# Patient Record
Sex: FEMALE | Race: WHITE | NOT HISPANIC OR LATINO | Employment: FULL TIME | URBAN - METROPOLITAN AREA
[De-identification: names, ages, dates, MRNs, and addresses within clinical notes are randomized per-mention and may not be internally consistent; named-entity substitution may affect disease eponyms.]

---

## 2023-04-15 PROBLEM — N60.02 BENIGN BREAST CYST IN FEMALE, LEFT: Status: ACTIVE | Noted: 2023-04-15

## 2023-04-15 PROBLEM — N63.20 BREAST MASS, LEFT: Status: ACTIVE | Noted: 2023-04-15

## 2023-04-15 NOTE — ASSESSMENT & PLAN NOTE
We discussed simple, complicated, and complex cystic changes at length.  We talked about our FCM Protocol and how cysts can change over time.  She was given our Handout that discussed the various cyst types and treatment options.    Her cyst appear benign (yet complicated) and can be followed with repeat imaging at 6 months.  She knows that these findings will not increase her future risk of developing a breast cancer.  Despite these recommendations, should she notice any change - she should report it to us immediately.

## 2023-04-15 NOTE — ASSESSMENT & PLAN NOTE
I reviewed her mammogram and agree that the area of concern is most consistent with a benign appearing asymmetry.  By US this was found to be a complicated cyst that can be followed conservatively.    She understands the importance of monthly self-breast examination and is to report changes as they occur.

## 2023-04-24 ENCOUNTER — OFFICE VISIT (OUTPATIENT)
Dept: SURGERY | Facility: CLINIC | Age: 43
End: 2023-04-24
Payer: COMMERCIAL

## 2023-04-24 VITALS — HEIGHT: 62 IN | BODY MASS INDEX: 29.81 KG/M2 | WEIGHT: 162 LBS

## 2023-04-24 DIAGNOSIS — N63.21 MASS OF UPPER OUTER QUADRANT OF LEFT BREAST: ICD-10-CM

## 2023-04-24 DIAGNOSIS — N60.02 BENIGN BREAST CYST IN FEMALE, LEFT: ICD-10-CM

## 2023-04-24 DIAGNOSIS — Z76.89 REFERRAL OF PATIENT: ICD-10-CM

## 2023-04-24 PROCEDURE — 1160F PR REVIEW ALL MEDS BY PRESCRIBER/CLIN PHARMACIST DOCUMENTED: ICD-10-PCS | Mod: CPTII,S$GLB,, | Performed by: SPECIALIST

## 2023-04-24 PROCEDURE — 1160F RVW MEDS BY RX/DR IN RCRD: CPT | Mod: CPTII,S$GLB,, | Performed by: SPECIALIST

## 2023-04-24 PROCEDURE — 1159F MED LIST DOCD IN RCRD: CPT | Mod: CPTII,S$GLB,, | Performed by: SPECIALIST

## 2023-04-24 PROCEDURE — 3008F PR BODY MASS INDEX (BMI) DOCUMENTED: ICD-10-PCS | Mod: CPTII,S$GLB,, | Performed by: SPECIALIST

## 2023-04-24 PROCEDURE — 3008F BODY MASS INDEX DOCD: CPT | Mod: CPTII,S$GLB,, | Performed by: SPECIALIST

## 2023-04-24 PROCEDURE — 1159F PR MEDICATION LIST DOCUMENTED IN MEDICAL RECORD: ICD-10-PCS | Mod: CPTII,S$GLB,, | Performed by: SPECIALIST

## 2023-04-24 PROCEDURE — 99213 OFFICE O/P EST LOW 20 MIN: CPT | Mod: S$GLB,,, | Performed by: SPECIALIST

## 2023-04-24 PROCEDURE — 99213 PR OFFICE/OUTPT VISIT, EST, LEVL III, 20-29 MIN: ICD-10-PCS | Mod: S$GLB,,, | Performed by: SPECIALIST

## 2023-11-16 ENCOUNTER — TELEPHONE (OUTPATIENT)
Dept: PRIMARY CARE CLINIC | Facility: CLINIC | Age: 43
End: 2023-11-16
Payer: COMMERCIAL

## 2023-11-16 NOTE — TELEPHONE ENCOUNTER
----- Message from Mike Ridley sent at 11/16/2023 10:33 AM CST -----  Contact: [phone]  Patient is calling in regards to seeing if she can get the international vaccine on her visit on tomorrow. Pt stated that she would be going out of the country. She is needing to know if she needs to fast for appt. Please call pt back at  [phone]. Thanks KB

## 2023-11-16 NOTE — TELEPHONE ENCOUNTER
Called pt, stated we don't do in international vaccines. We can do whatever other vaccines we offer

## 2023-11-17 ENCOUNTER — OFFICE VISIT (OUTPATIENT)
Dept: PRIMARY CARE CLINIC | Facility: CLINIC | Age: 43
End: 2023-11-17
Payer: COMMERCIAL

## 2023-11-17 VITALS
WEIGHT: 152.13 LBS | TEMPERATURE: 98 F | OXYGEN SATURATION: 99 % | SYSTOLIC BLOOD PRESSURE: 120 MMHG | HEART RATE: 81 BPM | HEIGHT: 62 IN | BODY MASS INDEX: 27.99 KG/M2 | DIASTOLIC BLOOD PRESSURE: 72 MMHG

## 2023-11-17 DIAGNOSIS — Z29.89 NEED FOR MALARIA PROPHYLAXIS: ICD-10-CM

## 2023-11-17 DIAGNOSIS — Z23 ENCOUNTER FOR IMMUNIZATION: Primary | ICD-10-CM

## 2023-11-17 DIAGNOSIS — Z28.21 HEPATITIS A VACCINATION DECLINED: ICD-10-CM

## 2023-11-17 DIAGNOSIS — Z23 NEED FOR TDAP VACCINATION: ICD-10-CM

## 2023-11-17 DIAGNOSIS — Z23 NEED FOR HEPATITIS B VACCINATION: ICD-10-CM

## 2023-11-17 DIAGNOSIS — Z23 NEED FOR VACCINATION: ICD-10-CM

## 2023-11-17 DIAGNOSIS — Z23 NEED FOR IMMUNIZATION AGAINST TYPHOID: ICD-10-CM

## 2023-11-17 DIAGNOSIS — Z23 NEED FOR HEPATITIS A IMMUNIZATION: ICD-10-CM

## 2023-11-17 PROCEDURE — 90739 HEPATITIS B (RECOMBINANT) ADJUVANTED, 2 DOSE: ICD-10-PCS | Mod: S$GLB,,, | Performed by: INTERNAL MEDICINE

## 2023-11-17 PROCEDURE — 3008F PR BODY MASS INDEX (BMI) DOCUMENTED: ICD-10-PCS | Mod: CPTII,S$GLB,, | Performed by: INTERNAL MEDICINE

## 2023-11-17 PROCEDURE — 1160F PR REVIEW ALL MEDS BY PRESCRIBER/CLIN PHARMACIST DOCUMENTED: ICD-10-PCS | Mod: CPTII,S$GLB,, | Performed by: INTERNAL MEDICINE

## 2023-11-17 PROCEDURE — 90715 TDAP VACCINE GREATER THAN OR EQUAL TO 7YO IM: ICD-10-PCS | Mod: S$GLB,,, | Performed by: INTERNAL MEDICINE

## 2023-11-17 PROCEDURE — 90686 IIV4 VACC NO PRSV 0.5 ML IM: CPT | Mod: S$GLB,,, | Performed by: INTERNAL MEDICINE

## 2023-11-17 PROCEDURE — 90715 TDAP VACCINE 7 YRS/> IM: CPT | Mod: S$GLB,,, | Performed by: INTERNAL MEDICINE

## 2023-11-17 PROCEDURE — 3074F SYST BP LT 130 MM HG: CPT | Mod: CPTII,S$GLB,, | Performed by: INTERNAL MEDICINE

## 2023-11-17 PROCEDURE — 99204 PR OFFICE/OUTPT VISIT, NEW, LEVL IV, 45-59 MIN: ICD-10-PCS | Mod: 25,S$GLB,, | Performed by: INTERNAL MEDICINE

## 2023-11-17 PROCEDURE — 1159F MED LIST DOCD IN RCRD: CPT | Mod: CPTII,S$GLB,, | Performed by: INTERNAL MEDICINE

## 2023-11-17 PROCEDURE — 3008F BODY MASS INDEX DOCD: CPT | Mod: CPTII,S$GLB,, | Performed by: INTERNAL MEDICINE

## 2023-11-17 PROCEDURE — 99999 PR PBB SHADOW E&M-EST. PATIENT-LVL IV: ICD-10-PCS | Mod: PBBFAC,,, | Performed by: INTERNAL MEDICINE

## 2023-11-17 PROCEDURE — 1159F PR MEDICATION LIST DOCUMENTED IN MEDICAL RECORD: ICD-10-PCS | Mod: CPTII,S$GLB,, | Performed by: INTERNAL MEDICINE

## 2023-11-17 PROCEDURE — 90739 HEPB VACC 2/4 DOSE ADULT IM: CPT | Mod: S$GLB,,, | Performed by: INTERNAL MEDICINE

## 2023-11-17 PROCEDURE — 3078F DIAST BP <80 MM HG: CPT | Mod: CPTII,S$GLB,, | Performed by: INTERNAL MEDICINE

## 2023-11-17 PROCEDURE — 90632 HEPATITIS A VACCINE ADULT IM: ICD-10-PCS | Mod: S$GLB,,, | Performed by: INTERNAL MEDICINE

## 2023-11-17 PROCEDURE — 3074F PR MOST RECENT SYSTOLIC BLOOD PRESSURE < 130 MM HG: ICD-10-PCS | Mod: CPTII,S$GLB,, | Performed by: INTERNAL MEDICINE

## 2023-11-17 PROCEDURE — 99204 OFFICE O/P NEW MOD 45 MIN: CPT | Mod: 25,S$GLB,, | Performed by: INTERNAL MEDICINE

## 2023-11-17 PROCEDURE — 99999 PR PBB SHADOW E&M-EST. PATIENT-LVL IV: CPT | Mod: PBBFAC,,, | Performed by: INTERNAL MEDICINE

## 2023-11-17 PROCEDURE — 90686 FLU VACCINE (QUAD) GREATER THAN OR EQUAL TO 3YO PRESERVATIVE FREE IM: ICD-10-PCS | Mod: S$GLB,,, | Performed by: INTERNAL MEDICINE

## 2023-11-17 PROCEDURE — 1160F RVW MEDS BY RX/DR IN RCRD: CPT | Mod: CPTII,S$GLB,, | Performed by: INTERNAL MEDICINE

## 2023-11-17 PROCEDURE — 3078F PR MOST RECENT DIASTOLIC BLOOD PRESSURE < 80 MM HG: ICD-10-PCS | Mod: CPTII,S$GLB,, | Performed by: INTERNAL MEDICINE

## 2023-11-17 PROCEDURE — 90471 IMMUNIZATION ADMIN: CPT | Mod: S$GLB,,, | Performed by: INTERNAL MEDICINE

## 2023-11-17 PROCEDURE — 90472 IMMUNIZATION ADMIN EACH ADD: CPT | Mod: S$GLB,,, | Performed by: INTERNAL MEDICINE

## 2023-11-17 PROCEDURE — 90471 HEPATITIS B (RECOMBINANT) ADJUVANTED, 2 DOSE: ICD-10-PCS | Mod: S$GLB,,, | Performed by: INTERNAL MEDICINE

## 2023-11-17 PROCEDURE — 90472 FLU VACCINE (QUAD) GREATER THAN OR EQUAL TO 3YO PRESERVATIVE FREE IM: ICD-10-PCS | Mod: S$GLB,,, | Performed by: INTERNAL MEDICINE

## 2023-11-17 PROCEDURE — 90632 HEPA VACCINE ADULT IM: CPT | Mod: S$GLB,,, | Performed by: INTERNAL MEDICINE

## 2023-11-17 RX ORDER — DOXYCYCLINE 100 MG/1
CAPSULE ORAL
Qty: 45 CAPSULE | Refills: 0 | Status: SHIPPED | OUTPATIENT
Start: 2023-11-17

## 2023-11-17 NOTE — PROGRESS NOTES
"Subjective     Patient ID: Bell Mccray is a 43 y.o. female.    Chief Complaint: Immunizations      HPI  Here as a new pt to establish care.  Also traveling to [place] for a week.  Leaving a in a couple of days.  We reviewed the [employer] travel guidelines together.  She is originally from [place] and didn't have much health care there.      Past Medical History:   Diagnosis Date    Benign breast cyst in female, left 4/15/2023    Breast mass, left 4/15/2023     Review of patient's allergies indicates:  No Known Allergies  History reviewed. No pertinent surgical history.  History reviewed. No pertinent family history.  Social History     Socioeconomic History    Marital status:    Tobacco Use    Smoking status: Never    Smokeless tobacco: Never   Substance and Sexual Activity    Alcohol use: Yes    Drug use: Never    Sexual activity: Yes     Partners: Male         /72   Pulse 81   Temp 97.6 °F (36.4 °C)   Ht 5' 2" (1.575 m)   Wt 69 kg (152 lb 1.9 oz)   LMP 11/01/2023   SpO2 99%   BMI 27.82 kg/m²   No current outpatient medications on file as of 11/17/2023.     No current facility-administered medications on file as of 11/17/2023.       Review of Systems   All other systems reviewed and are negative.         Objective     Physical Exam  Constitutional:       General: She is not in acute distress.     Appearance: Normal appearance. She is not ill-appearing, toxic-appearing or diaphoretic.   HENT:      Head: Normocephalic and atraumatic.   Cardiovascular:      Rate and Rhythm: Normal rate.   Pulmonary:      Effort: Pulmonary effort is normal.   Skin:     General: Skin is dry.   Neurological:      General: No focal deficit present.      Mental Status: She is alert and oriented to person, place, and time.   Psychiatric:         Mood and Affect: Mood normal.         Behavior: Behavior normal.            Assessment and Plan     1. Encounter for immunization  -     Ambulatory referral/consult to Family " Practice    2. Need for Tdap vaccination  -     (In Office Administered) Tdap Vaccine    3. Hepatitis A vaccination declined    4. Need for hepatitis A immunization  -     (In Office Administered) Hepatitis A Vaccine (Adult) (IM)    5. Need for immunization against typhoid  -     (In Office Administered) Typhoid Vaccine (ViCPs) (IM)    6. Need for hepatitis B vaccination  -     (In Office Administered) Hepatitis B (Recombinant) Adjuvanted, 2 dose    7. Need for malaria prophylaxis  -     doxycycline (VIBRAMYCIN) 100 MG Cap; Take 1 capsule day before travel & daily during travel and for 4 weeks after leaving  Dispense: 45 capsule; Refill: 0    8. Need for vaccination  -     Influenza - Quadrivalent (PF)         Follow up in about 6 months (around 5/17/2024) for annual.    Immunization History   Administered Date(s) Administered    COVID-19, MRNA, LN-S, PF (MODERNA FULL 0.5 ML DOSE) 02/26/2021, 03/26/2021, 10/30/2021    COVID-19, rS-ChAdOx1, PF ([employer]) 10/30/2021    Hepatitis A, Adult 11/17/2023    Hepatitis B (recombinant) Adjuvanted, 2 dose 11/17/2023    Influenza (FLUBLOK) - Quadrivalent - Recombinant - PF *Preferred* (egg allergy) 11/04/2022    Influenza - Quadrivalent - PF *Preferred* (6 months and older) 11/13/2019, 11/02/2021, 11/17/2023    Tdap 11/17/2023

## 2024-04-08 DIAGNOSIS — N60.02 BENIGN BREAST CYST IN FEMALE, LEFT: Primary | ICD-10-CM

## 2024-04-08 NOTE — PROGRESS NOTES
Date of Service: 4/29/2024    Chief Complaint:   Bell Mccray is a 44 y.o. female presenting today for her 6-month evaluation. She is due for a repeat mammogram and ultrasound.  She reports no interval changes.     History of Present Illness:   Mrs. Mccray first presents on April 24, 2023 to discuss her recent abnormal imaging that showed a complicated cyst in the left breast.  Short-term follow up has been recommended.  MD:::Janina Rahman MD    Past Medical History:   Diagnosis Date    Benign breast cyst in female, left 4/15/2023    Breast mass, left 4/15/2023      No past surgical history on file.     Current Outpatient Medications:     doxycycline (VIBRAMYCIN) 100 MG Cap, Take 1 capsule day before travel & daily during travel and for 4 weeks after leaving, Disp: 45 capsule, Rfl: 0   Review of patient's allergies indicates:  No Known Allergies   Social History     Tobacco Use    Smoking status: Never    Smokeless tobacco: Never   Substance Use Topics    Alcohol use: Yes      No family history on file.     Review of Systems   Integumentary:  Negative for color change, rash, mole/lesion, thickening/swelling, dimpling of skin, drainage  Breast: Negative for mass and tenderness     Physical Exam   Constitutional: She appears well-developed. She is cooperative.   HENT: Normocephalic.   Cardiovascular:  Normal rate and regular rhythm.            Pulmonary/Chest: She exhibits no tenderness and no bony tenderness.   Abdominal: Soft. Normal appearance.   Musculoskeletal: Intact with no deficits  Neurological: She is alert.   Skin: No rash noted.   Breast and Chest Wall Evaluation:   Right breast exhibits no mass, no nipple discharge, no skin change and no tenderness.     Left breast exhibits no mass, no nipple discharge, no skin change and no tenderness.     Lymphadenopathy: No supraclavicular or axillary adenopathy.    MAMMOGRAM REPORT: She has some diffuse fibronodular tissue; there are no spiculated lesions,  distortions or suspicious calcifications noted; NEM    ULTRASOUND REPORT: She has a stable complicated cyst in the left breast;  NEM    NOTE:::We viewed her films together at today's visit.  We discussed the multiple views obtained and the important findings.  Even benign changes were mentioned and her questions were answered.  She knows that she may receive a formal letter or report from the Radiologist.  She is to contact us if she has questions.      ASSESSMENT and PLAN OF CARE     1. Benign breast cyst in female, left  Assessment & Plan:  We discussed simple, complicated, and complex cystic changes at length.  We talked about our FCM Protocol and how cysts can change over time.  She was given our Handout that discussed the various cyst types and treatment options.    Her cysts appear benign (yet complicated) and can be followed with repeat imaging at 6 months.  She knows that these findings will not increase her future risk of developing a breast cancer.  Despite these recommendations, should she notice any change - she should report it to us immediately.        2. Mass of left breast, unspecified quadrant  Assessment & Plan:  We reviewed our findings today and her questions were answered.  She understands that her imaging and exams have remained stable (and show nothing concerning).  She is comfortable being followed in a conservative fashion.      She understands the importance of monthly self-breast examination and knows to report any and all changes as they occur.        Medical Decision Making: It is my impression that this patient suffers all conditions contained in this medical document.  Each of these conditions did affect our plan of care and my medical decision making today.  It is my opinion that the medical decision making concerning this patient was of minimal  difficulty based on the aforementioned conditions.  Any further recommendations will be communicated to the patient by me.  I have reviewed  and verified her allergies, list of medications, medical and surgical histories, social history, and a pertinent review of symptoms.     Follow up: 1 year and prn for PE, ALYSSA MORALES) at [facility]

## 2024-04-10 DIAGNOSIS — N63.20 MASS OF LEFT BREAST, UNSPECIFIED QUADRANT: ICD-10-CM

## 2024-04-10 DIAGNOSIS — N60.02 BENIGN BREAST CYST IN FEMALE, LEFT: Primary | ICD-10-CM

## 2024-04-10 NOTE — ASSESSMENT & PLAN NOTE
We discussed simple, complicated, and complex cystic changes at length.  We talked about our FCM Protocol and how cysts can change over time.  She was given our Handout that discussed the various cyst types and treatment options.    Her cysts appear benign (yet complicated) and can be followed with repeat imaging at 6 months.  She knows that these findings will not increase her future risk of developing a breast cancer.  Despite these recommendations, should she notice any change - she should report it to us immediately.

## 2024-04-19 ENCOUNTER — PATIENT MESSAGE (OUTPATIENT)
Dept: RESEARCH | Facility: HOSPITAL | Age: 44
End: 2024-04-19
Payer: COMMERCIAL

## 2024-04-26 ENCOUNTER — OFFICE VISIT (OUTPATIENT)
Dept: PRIMARY CARE CLINIC | Facility: CLINIC | Age: 44
End: 2024-04-26
Payer: COMMERCIAL

## 2024-04-26 VITALS
SYSTOLIC BLOOD PRESSURE: 112 MMHG | BODY MASS INDEX: 28.61 KG/M2 | WEIGHT: 155.44 LBS | OXYGEN SATURATION: 98 % | HEART RATE: 74 BPM | HEIGHT: 62 IN | TEMPERATURE: 97 F | DIASTOLIC BLOOD PRESSURE: 62 MMHG

## 2024-04-26 DIAGNOSIS — Z71.84 TRAVEL ADVICE ENCOUNTER: ICD-10-CM

## 2024-04-26 DIAGNOSIS — Z09 FOLLOW-UP EXAM: Primary | ICD-10-CM

## 2024-04-26 DIAGNOSIS — Z23 NEED FOR HEPATITIS A VACCINATION: ICD-10-CM

## 2024-04-26 DIAGNOSIS — Z23 NEED FOR HEPATITIS B VACCINATION: ICD-10-CM

## 2024-04-26 PROCEDURE — 3008F BODY MASS INDEX DOCD: CPT | Mod: CPTII,S$GLB,, | Performed by: INTERNAL MEDICINE

## 2024-04-26 PROCEDURE — 90739 HEPB VACC 2/4 DOSE ADULT IM: CPT | Mod: S$GLB,,, | Performed by: INTERNAL MEDICINE

## 2024-04-26 PROCEDURE — 90472 IMMUNIZATION ADMIN EACH ADD: CPT | Mod: S$GLB,,, | Performed by: INTERNAL MEDICINE

## 2024-04-26 PROCEDURE — 99213 OFFICE O/P EST LOW 20 MIN: CPT | Mod: 25,S$GLB,, | Performed by: INTERNAL MEDICINE

## 2024-04-26 PROCEDURE — 99999 PR PBB SHADOW E&M-EST. PATIENT-LVL IV: CPT | Mod: PBBFAC,,, | Performed by: INTERNAL MEDICINE

## 2024-04-26 PROCEDURE — 3078F DIAST BP <80 MM HG: CPT | Mod: CPTII,S$GLB,, | Performed by: INTERNAL MEDICINE

## 2024-04-26 PROCEDURE — 90632 HEPA VACCINE ADULT IM: CPT | Mod: S$GLB,,, | Performed by: INTERNAL MEDICINE

## 2024-04-26 PROCEDURE — 3074F SYST BP LT 130 MM HG: CPT | Mod: CPTII,S$GLB,, | Performed by: INTERNAL MEDICINE

## 2024-04-26 PROCEDURE — 1159F MED LIST DOCD IN RCRD: CPT | Mod: CPTII,S$GLB,, | Performed by: INTERNAL MEDICINE

## 2024-04-26 PROCEDURE — 90471 IMMUNIZATION ADMIN: CPT | Mod: S$GLB,,, | Performed by: INTERNAL MEDICINE

## 2024-04-26 NOTE — PROGRESS NOTES
"Subjective     Patient ID: Bell Mccray is a 44 y.o. female.    Chief Complaint: Follow-up      HPI  Here for updated immunizations.  Due for hep b and hep a.  She plans on extensive travel but unsure of where and when.  Offered once she has her itenerary to share and I would look up [facility] travel updates for those areas.  She has no other concerns.      Past Medical History:   Diagnosis Date    Benign breast cyst in female, left 4/15/2023    Breast mass, left 4/15/2023     Review of patient's allergies indicates:  No Known Allergies  No past surgical history on file.  No family history on file.  Social History     Socioeconomic History    Marital status:    Tobacco Use    Smoking status: Never    Smokeless tobacco: Never   Substance and Sexual Activity    Alcohol use: Yes    Drug use: Never    Sexual activity: Yes     Partners: Male         /62 (BP Location: Right arm)   Pulse 74   Temp 97.3 °F (36.3 °C) (Tympanic)   Ht 5' 2.21" (1.58 m)   Wt 70.5 kg (155 lb 6.8 oz)   LMP 04/01/2024 (Approximate)   SpO2 98%   BMI 28.24 kg/m²   No current outpatient medications on file as of 4/26/2024.     Facility-Administered Medications as of 4/26/2024   Medication Dose Route Frequency Provider Last Rate Last Admin    hepatitis B virus (PF) (VFC) vaccine injection 0.5 mL  0.5 mL Intramuscular 1 time in Clinic/HOD         [COMPLETED] VFC-hepatitis A (PF) (VAQTA) 25 unit/0.5 mL vaccine 25 Units  25 Units Intramuscular 1 time in Clinic/HOD    25 Units at 04/26/24 0733       Review of Systems   All other systems reviewed and are negative.         Objective     Physical Exam  Constitutional:       Appearance: Normal appearance.   HENT:      Head: Normocephalic and atraumatic.   Cardiovascular:      Rate and Rhythm: Normal rate.   Pulmonary:      Effort: No respiratory distress.   Musculoskeletal:      Cervical back: Neck supple.   Neurological:      Mental Status: She is alert and oriented to person, place, and time. "   Psychiatric:         Mood and Affect: Mood normal.         Behavior: Behavior normal.            Assessment and Plan     1. Follow-up exam    2. Need for hepatitis B vaccination  -     Discontinue: hepatitis B virus (PF) (VFC) vaccine injection 0.5 mL  Dispense: 0.5 mL; Refill: 0  -     Discontinue: hepatitis B virus (PF) (VFC) vaccine injection 0.5 mL  -     hepatitis B virus (PF) (VFC) vaccine injection 0.5 mL  Dispense: 0.5 mL; Refill: 0  -     (In Office Administered) Hepatitis B (Recombinant) Adjuvanted, 2 dose    3. Need for hepatitis A vaccination  -     VFC-hepatitis A (PF) (VAQTA) 25 unit/0.5 mL vaccine 25 Units  Dispense: 0.5 mL; Refill: 0    4. Travel advice encounter             Immunization History   Administered Date(s) Administered    COVID-19, MRNA, LN-S, PF (MODERNA FULL 0.5 ML DOSE) 02/26/2021, 03/26/2021, 10/30/2021    COVID-19, rS-ChAdOx1, PF ([employer]) 10/30/2021    Hepatitis A, Adult 11/17/2023    Hepatitis A, Pediatric/Adolescent, 2 Dose 04/26/2024    Hepatitis B (recombinant) Adjuvanted, 2 dose 11/17/2023, 04/26/2024    Influenza (FLUBLOK) - Quadrivalent - Recombinant - PF *Preferred* (egg allergy) 11/04/2022    Influenza - Quadrivalent - PF *Preferred* (6 months and older) 11/13/2019, 11/02/2021, 11/17/2023    Tdap 11/17/2023       I spent a total of 20 minutes on the day of the visit.This includes face to face time and non-face to face time preparing to see the patient (eg, review of tests), obtaining and/or reviewing separately obtained history, documenting clinical information in the electronic or other health record, independently interpreting results and communicating results to the patient/family/caregiver, or care coordinator.

## 2024-04-29 ENCOUNTER — OFFICE VISIT (OUTPATIENT)
Dept: SURGERY | Facility: CLINIC | Age: 44
End: 2024-04-29
Payer: COMMERCIAL

## 2024-04-29 DIAGNOSIS — N60.02 BENIGN BREAST CYST IN FEMALE, LEFT: Primary | ICD-10-CM

## 2024-04-29 DIAGNOSIS — N63.20 MASS OF LEFT BREAST, UNSPECIFIED QUADRANT: ICD-10-CM

## 2024-04-29 PROCEDURE — 1159F MED LIST DOCD IN RCRD: CPT | Mod: CPTII,S$GLB,, | Performed by: SPECIALIST

## 2024-04-29 PROCEDURE — 1160F RVW MEDS BY RX/DR IN RCRD: CPT | Mod: CPTII,S$GLB,, | Performed by: SPECIALIST

## 2024-04-29 PROCEDURE — 99213 OFFICE O/P EST LOW 20 MIN: CPT | Mod: S$GLB,,, | Performed by: SPECIALIST

## 2024-04-29 PROCEDURE — 99999 PR PBB SHADOW E&M-EST. PATIENT-LVL II: CPT | Mod: PBBFAC,,, | Performed by: SPECIALIST

## 2024-05-01 PROBLEM — Z12.11 COLON CANCER SCREENING: Status: ACTIVE | Noted: 2024-05-01

## 2024-05-03 ENCOUNTER — HOSPITAL ENCOUNTER (OUTPATIENT)
Dept: PREADMISSION TESTING | Facility: HOSPITAL | Age: 44
Discharge: HOME | End: 2024-05-03
Attending: STUDENT IN AN ORGANIZED HEALTH CARE EDUCATION/TRAINING PROGRAM
Payer: COMMERCIAL

## 2024-05-03 DIAGNOSIS — Z12.11 COLON CANCER SCREENING: ICD-10-CM

## 2024-05-09 ENCOUNTER — HOSPITAL ENCOUNTER (OUTPATIENT)
Dept: PREADMISSION TESTING | Facility: HOSPITAL | Age: 44
Discharge: HOME | End: 2024-05-09
Attending: INTERNAL MEDICINE
Payer: COMMERCIAL

## 2024-05-09 DIAGNOSIS — Z12.11 COLON CANCER SCREENING: Primary | ICD-10-CM

## 2024-05-09 RX ORDER — SODIUM, POTASSIUM,MAG SULFATES 17.5-3.13G
1 SOLUTION, RECONSTITUTED, ORAL ORAL DAILY
Qty: 1 KIT | Refills: 0 | Status: SHIPPED | OUTPATIENT
Start: 2024-05-09 | End: 2024-05-11

## 2024-05-21 ENCOUNTER — ANESTHESIA EVENT (OUTPATIENT)
Dept: ENDOSCOPY | Facility: HOSPITAL | Age: 44
End: 2024-05-21
Payer: COMMERCIAL

## 2024-05-21 NOTE — ANESTHESIA PREPROCEDURE EVALUATION
05/21/2024  Bell Mccray is a 44 y.o., female.  Past Medical History:   Diagnosis Date    Benign breast cyst in female, left 4/15/2023    Breast mass, left 4/15/2023     History reviewed. No pertinent surgical history.      Pre-op Assessment    I have reviewed the Patient Summary Reports.     I have reviewed the Nursing Notes. I have reviewed the NPO Status.   I have reviewed the Medications.     Review of Systems  Anesthesia Hx:  No problems with previous Anesthesia   History of prior surgery of interest to airway management or planning:             Social:  Non-Smoker, Social Alcohol Use       Hepatic/GI:  Bowel Prep.                    Physical Exam  General: Well nourished, Cooperative, Alert and Oriented    Airway:  Mallampati: I   Mouth Opening: Normal  Tongue: Normal  Neck ROM: Normal ROM        Anesthesia Plan  Type of Anesthesia, risks & benefits discussed:    Anesthesia Type: Gen Natural Airway  Intra-op Monitoring Plan: Standard ASA Monitors  Post Op Pain Control Plan: multimodal analgesia  Induction:  IV  Informed Consent: Informed consent signed with the Patient and all parties understand the risks and agree with anesthesia plan.  All questions answered. Patient consented to blood products? No  ASA Score: 1  Day of Surgery Review of History & Physical: H&P Update referred to the surgeon/provider.    Ready For Surgery From Anesthesia Perspective.     .

## 2024-05-23 ENCOUNTER — ANESTHESIA (OUTPATIENT)
Dept: ENDOSCOPY | Facility: HOSPITAL | Age: 44
End: 2024-05-23
Payer: COMMERCIAL

## 2024-05-23 ENCOUNTER — HOSPITAL ENCOUNTER (OUTPATIENT)
Facility: HOSPITAL | Age: 44
Discharge: HOME | End: 2024-05-23
Attending: INTERNAL MEDICINE | Admitting: INTERNAL MEDICINE
Payer: COMMERCIAL

## 2024-05-23 VITALS
HEART RATE: 66 BPM | DIASTOLIC BLOOD PRESSURE: 71 MMHG | HEIGHT: 62 IN | OXYGEN SATURATION: 100 % | WEIGHT: 149.81 LBS | TEMPERATURE: 97 F | RESPIRATION RATE: 18 BRPM | SYSTOLIC BLOOD PRESSURE: 118 MMHG | BODY MASS INDEX: 27.57 KG/M2

## 2024-05-23 DIAGNOSIS — Z12.11 COLON CANCER SCREENING: Primary | ICD-10-CM

## 2024-05-23 LAB
B-HCG UR QL: NEGATIVE
CTP QC/QA: YES

## 2024-05-23 PROCEDURE — 81025 URINE PREGNANCY TEST: CPT | Performed by: INTERNAL MEDICINE

## 2024-05-23 PROCEDURE — 37000009 HC ANESTHESIA EA ADD 15 MINS: Performed by: INTERNAL MEDICINE

## 2024-05-23 PROCEDURE — D9220A PRA ANESTHESIA: Mod: 33,,, | Performed by: NURSE ANESTHETIST, CERTIFIED REGISTERED

## 2024-05-23 PROCEDURE — 63600175 PHARM REV CODE 636 W HCPCS: Performed by: INTERNAL MEDICINE

## 2024-05-23 PROCEDURE — 63600175 PHARM REV CODE 636 W HCPCS: Performed by: NURSE ANESTHETIST, CERTIFIED REGISTERED

## 2024-05-23 PROCEDURE — 45380 COLONOSCOPY AND BIOPSY: CPT | Mod: PT | Performed by: INTERNAL MEDICINE

## 2024-05-23 PROCEDURE — 25000003 PHARM REV CODE 250: Performed by: NURSE ANESTHETIST, CERTIFIED REGISTERED

## 2024-05-23 PROCEDURE — 27201012 HC FORCEPS, HOT/COLD, DISP: Performed by: INTERNAL MEDICINE

## 2024-05-23 PROCEDURE — 88305 TISSUE EXAM BY PATHOLOGIST: CPT | Mod: 26,,, | Performed by: PATHOLOGY

## 2024-05-23 PROCEDURE — 45380 COLONOSCOPY AND BIOPSY: CPT | Mod: 33,,, | Performed by: INTERNAL MEDICINE

## 2024-05-23 PROCEDURE — 37000008 HC ANESTHESIA 1ST 15 MINUTES: Performed by: INTERNAL MEDICINE

## 2024-05-23 PROCEDURE — 88305 TISSUE EXAM BY PATHOLOGIST: CPT | Performed by: PATHOLOGY

## 2024-05-23 RX ORDER — LIDOCAINE HYDROCHLORIDE 20 MG/ML
INJECTION INTRAVENOUS
Status: DISCONTINUED | OUTPATIENT
Start: 2024-05-23 | End: 2024-05-23

## 2024-05-23 RX ORDER — SODIUM CHLORIDE, SODIUM LACTATE, POTASSIUM CHLORIDE, CALCIUM CHLORIDE 600; 310; 30; 20 MG/100ML; MG/100ML; MG/100ML; MG/100ML
INJECTION, SOLUTION INTRAVENOUS CONTINUOUS
Status: DISCONTINUED | OUTPATIENT
Start: 2024-05-23 | End: 2024-05-23 | Stop reason: HOSPADM

## 2024-05-23 RX ORDER — PROPOFOL 10 MG/ML
INJECTION, EMULSION INTRAVENOUS
Status: DISCONTINUED | OUTPATIENT
Start: 2024-05-23 | End: 2024-05-23

## 2024-05-23 RX ADMIN — SODIUM CHLORIDE, POTASSIUM CHLORIDE, SODIUM LACTATE AND CALCIUM CHLORIDE: 600; 310; 30; 20 INJECTION, SOLUTION INTRAVENOUS at 09:05

## 2024-05-23 RX ADMIN — PROPOFOL 150 MG: 10 INJECTION, EMULSION INTRAVENOUS at 10:05

## 2024-05-23 RX ADMIN — PROPOFOL 30 MG: 10 INJECTION, EMULSION INTRAVENOUS at 10:05

## 2024-05-23 RX ADMIN — PROPOFOL 20 MG: 10 INJECTION, EMULSION INTRAVENOUS at 10:05

## 2024-05-23 RX ADMIN — LIDOCAINE HYDROCHLORIDE 50 MG: 20 INJECTION INTRAVENOUS at 10:05

## 2024-05-23 NOTE — H&P
Endoscopy History and Physical    PCP - Paris Gonzalez MD  Referring Physician - Gita Andersen MD  [id number] [facility] [place] [address]      ASA - per anesthesia  Mallampati - per anesthesia  History of Anesthesia problems - no  Family history Anesthesia problems -  no   Plan of anesthesia - General    HPI  44 y.o. female    Planned Procedure: Colonoscopy  Diagnosis: screening for colon cancer  Chief Complaint: Same as above    Personnel H/o colon polyps:index  FH of colon cancer:no  Anticoagulation:no      ROS:  Constitutional: No fevers, chills, No weight loss  CV: No chest pain  Pulm: No cough, No shortness of breath  GI: see HPI    Medical History:  has a past medical history of Benign breast cyst in female, left (4/15/2023) and Breast mass, left (4/15/2023).    Surgical History:  has no past surgical history on file.    Family History: family history includes Heart disease in her father..    Social History:  reports that she has never smoked. She has never used smokeless tobacco. She reports current alcohol use. She reports that she does not use drugs.    Review of patient's allergies indicates:  No Known Allergies    Medications:   No medications prior to admission.       Physical Exam:    Vital Signs:   Vitals:    05/23/24 0937   BP: 131/75   Pulse: 64   Resp: 18   Temp: 97.6 °F (36.4 °C)       General Appearance: Well appearing in no acute distress  Abdomen: Soft, non tender, non distended with normal bowel sounds, no masses    Labs:  Lab Results   Component Value Date    TSH 0.71 11/22/2023       I have explained the risks and benefits of this endoscopic procedure to the patient including but not limited to bleeding, inflammation, infection, perforation, and death.    SEDATION PLAN: per anesthesia       History reviewed, vital signs satisfactory, cardiopulmonary status satisfactory, sedation options, risks and plans have been discussed with the patient  All their questions were  answered and the patient agrees to the sedation procedures as planned and the patient is deemed an appropriate candidate for the sedation as planned.     The risks, benefits and alternatives of the procedure were discussed with the patient in detail. This discussion was had in the presence of endoscopy staff. The risks include, risks of adverse reaction to sedation requiring the use of reversal agents, bleeding requiring blood transfusion, perforation requiring surgical intervention and technical failure. Other risks include aspiration leading to respiratory distress and respiratory failure resulting in endotracheal intubation and mechanical ventilation including death. If anesthesia is being utilized for this procedure, it is up to the anesthesiologist to determine airway safety including elective endotracheal intubation. Questions were answered, they agree to proceed. There was no language barriers.       Procedure explained to patient, informed consent obtained and placed in chart.       Denis Harvey MD

## 2024-05-23 NOTE — ANESTHESIA POSTPROCEDURE EVALUATION
Anesthesia Post Evaluation    Patient: Bell Mccray    Procedure(s) Performed: Procedure(s) (LRB):  COLONOSCOPY (N/A)    Final Anesthesia Type: general      Patient location during evaluation: PACU  Patient participation: Yes- Able to Participate  Level of consciousness: awake  Post-procedure vital signs: reviewed and stable  Pain management: adequate  Airway patency: patent    PONV status at discharge: No PONV  Anesthetic complications: no      Cardiovascular status: stable  Respiratory status: unassisted  Hydration status: euvolemic  Follow-up not needed.              Vitals Value Taken Time   BP 99/56 05/23/24 1103        Pulse 64 05/23/24 1104   Resp 17 05/23/24 1104   SpO2 100 % 05/23/24 1104   Vitals shown include unfiled device data.      No case tracking events are documented in the log.      Pain/Mayra Score: No data recorded

## 2024-05-23 NOTE — DISCHARGE SUMMARY
[facility]  Discharge Note  Short Stay    Procedure(s) (LRB):  COLONOSCOPY (N/A)      OUTCOME: Patient tolerated treatment/procedure well without complication and is now ready for discharge.    DISPOSITION: Home or Self Care    FINAL DIAGNOSIS:  <principal problem not specified>    FOLLOWUP: With primary care provider    DISCHARGE INSTRUCTIONS:  No discharge procedures on file.     TIME SPENT ON DISCHARGE: 20 minutes

## 2024-05-23 NOTE — TRANSFER OF CARE
"Anesthesia Transfer of Care Note    Patient: Bell Mccray    Procedure(s) Performed: Procedure(s) (LRB):  COLONOSCOPY (N/A)    Patient location: PACU    Anesthesia Type: general    Transport from OR: Transported from OR on room air with adequate spontaneous ventilation    Post pain: adequate analgesia    Post assessment: no apparent anesthetic complications and tolerated procedure well    Post vital signs: stable    Level of consciousness: awake    Nausea/Vomiting: no nausea/vomiting    Complications: none    Transfer of care protocol was followed    Last vitals: Visit Vitals  /75 (BP Location: Right arm, Patient Position: Sitting)   Pulse 64   Temp 36.4 °C (97.6 °F) (Temporal)   Resp 18   Ht 5' 2" (1.575 m)   Wt 67.9 kg (149 lb 12.8 oz)   LMP 04/26/2024 (Approximate)   SpO2 100%   Breastfeeding No   BMI 27.40 kg/m²     "

## 2024-05-23 NOTE — PLAN OF CARE
Discharge instructions reviewed with patient and visitor. Handouts given & verbalized understanding with no further questions at this time. Dr. Harvey spoke to pt at bedside, reviewed procedure and findings, answered questions. Made aware they are awaiting biopsy results with MD telephone number provided per AVS sheet. VSS on RA, no pain or nausea noted, tolerating po fluids, no complaints noted. Fall precautions reviewed, consents in chart, PIV removed at this time.

## 2024-05-23 NOTE — [employer] PATIENT INSTRUCTIONS
Discharge Summary/Instructions after an Endoscopic Procedure  Patient Name: Bell Mccray  Patient MRN: [id number]  Patient YOB: 1980  Thursday, May 23, 2024  Denis Harvey MD  Dear patient,  As a result of recent federal legislation (The Federal Cures Act), you may   receive lab or pathology results from your procedure in your [facility]   account before your physician is able to contact you. Your physician or   their representative will relay the results to you with their   recommendations at their soonest availability.  Thank you,  RESTRICTIONS:  During your procedure today, you received medications for sedation.  These   medications may affect your judgment, balance and coordination.  Therefore,   for 24 hours, you have the following restrictions:   - DO NOT drive a car, operate machinery, make legal/financial decisions,   sign important papers or drink alcohol.    ACTIVITY:  Today: no heavy lifting, straining or running due to procedural   sedation/anesthesia.  The following day: return to full activity including work.  DIET:  Eat and drink normally unless instructed otherwise.     TREATMENT FOR COMMON SIDE EFFECTS:  - Mild abdominal pain, nausea, belching, bloating or excessive gas:  rest,   eat lightly and use a heating pad.  - Sore Throat: treat with throat lozenges and/or gargle with warm salt   water.  - Because air was used during the procedure, expelling large amounts of air   from your rectum or belching is normal.  - If a bowel prep was taken, you may not have a bowel movement for 1-3 days.    This is normal.  SYMPTOMS TO WATCH FOR AND REPORT TO YOUR PHYSICIAN:  1. Abdominal pain or bloating, other than gas cramps.  2. Chest pain.  3. Back pain.  4. Signs of infection such as: chills or fever occurring within 24 hours   after the procedure.  5. Rectal bleeding, which would show as bright red, maroon, or black stools.   (A tablespoon of blood from the rectum is not serious, especially  if   hemorrhoids are present.)  6. Vomiting.  7. Weakness or dizziness.  GO DIRECTLY TO THE NEAREST EMERGENCY ROOM IF YOU HAVE ANY OF THE FOLLOWING:      Difficulty breathing              Chills and/or fever over 101 F   Persistent vomiting and/or vomiting blood   Severe abdominal pain   Severe chest pain   Black, tarry stools   Bleeding- more than one tablespoon   Any other symptom or condition that you feel may need urgent attention  Your doctor recommends these additional instructions:  If any biopsies were taken, your doctors clinic will contact you in 1 to 2   weeks with any results.  - Discharge patient to home.   - Resume previous diet.   - Continue present medications.   - Await pathology results.   - Repeat colonoscopy in 7-10 years for surveillance based on pathology   results.   - Return to referring physician as previously scheduled.   - Patient has a contact number available for emergencies.  The signs and   symptoms of potential delayed complications were discussed with the   patient.  Return to normal activities tomorrow.  Written discharge   instructions were provided to the patient.  For questions, problems or results please call your physician Denis Harvey MD at Work:  [phone]  If you have any questions about the above instructions, call the GI   department at [phone] or call the endoscopy unit at [phone]   from 7am until 3 pm.  [facility] [place], EMERGENCY ROOM PHONE NUMBER:   [phone]  IF A COMPLICATION OR EMERGENCY SITUATION ARISES AND YOU ARE UNABLE TO REACH   YOUR PHYSICIAN - GO DIRECTLY TO THE EMERGENCY ROOM.  I have read or have had read to me these discharge instructions for my   procedure and have received a written copy.  I understand these   instructions and will follow-up with my physician if I have any questions.     __________________________________       _____________________________________  Nurse Signature                                           Patient/Designated   Responsible Party Signature  MD Denis Prather MD  5/23/2024 10:58:10 AM  This report has been verified and signed electronically.  Dear patient,  As a result of recent federal legislation (The Federal Cures Act), you may   receive lab or pathology results from your procedure in your [facility]   account before your physician is able to contact you. Your physician or   their representative will relay the results to you with their   recommendations at their soonest availability.  Thank you,  [employer]

## 2024-05-27 LAB
FINAL PATHOLOGIC DIAGNOSIS: NORMAL
GROSS: NORMAL
Lab: NORMAL

## 2024-05-28 ENCOUNTER — TELEPHONE (OUTPATIENT)
Dept: HEPATOLOGY | Facility: CLINIC | Age: 44
End: 2024-05-28
Payer: COMMERCIAL

## 2024-05-28 NOTE — TELEPHONE ENCOUNTER
----- Message from Sole Rodríguez sent at 5/28/2024  9:49 AM CDT -----  Regarding: pt labs    Test Result:Pt       Who Called: n/a       Name of Test:  colonoscopy       Would the patient rather a call back or a response via [facility]?  CALLBACK       Best Call Back Number:Telephone Information:  BDA          [phone]

## 2024-05-28 NOTE — TELEPHONE ENCOUNTER
Called pt back and reviewed results and provider comments. The pt stated she understood and had no questions or concerns at this time.